# Patient Record
Sex: MALE | Race: WHITE | Employment: FULL TIME | URBAN - METROPOLITAN AREA
[De-identification: names, ages, dates, MRNs, and addresses within clinical notes are randomized per-mention and may not be internally consistent; named-entity substitution may affect disease eponyms.]

---

## 2017-01-12 ENCOUNTER — OFFICE VISIT (OUTPATIENT)
Dept: FAMILY MEDICINE | Facility: CLINIC | Age: 57
End: 2017-01-12
Payer: COMMERCIAL

## 2017-01-12 VITALS
DIASTOLIC BLOOD PRESSURE: 88 MMHG | BODY MASS INDEX: 28.71 KG/M2 | HEIGHT: 69 IN | HEART RATE: 92 BPM | TEMPERATURE: 97.2 F | WEIGHT: 193.8 LBS | SYSTOLIC BLOOD PRESSURE: 132 MMHG

## 2017-01-12 DIAGNOSIS — K21.00 GASTROESOPHAGEAL REFLUX DISEASE WITH ESOPHAGITIS: ICD-10-CM

## 2017-01-12 DIAGNOSIS — Z23 NEED FOR DIPHTHERIA-TETANUS-PERTUSSIS (TDAP) VACCINE: ICD-10-CM

## 2017-01-12 DIAGNOSIS — Z12.11 COLON CANCER SCREENING: ICD-10-CM

## 2017-01-12 DIAGNOSIS — Z00.00 ROUTINE GENERAL MEDICAL EXAMINATION AT A HEALTH CARE FACILITY: Primary | ICD-10-CM

## 2017-01-12 DIAGNOSIS — Z13.6 CARDIOVASCULAR SCREENING; LDL GOAL LESS THAN 160: ICD-10-CM

## 2017-01-12 PROCEDURE — 90471 IMMUNIZATION ADMIN: CPT | Performed by: FAMILY MEDICINE

## 2017-01-12 PROCEDURE — 99396 PREV VISIT EST AGE 40-64: CPT | Mod: 25 | Performed by: FAMILY MEDICINE

## 2017-01-12 PROCEDURE — 90715 TDAP VACCINE 7 YRS/> IM: CPT | Performed by: FAMILY MEDICINE

## 2017-01-12 RX ORDER — MULTIPLE VITAMINS W/ MINERALS TAB 9MG-400MCG
1 TAB ORAL DAILY
COMMUNITY

## 2017-01-12 NOTE — NURSING NOTE
"Chief Complaint   Patient presents with     Physical     Patient is not fasting for labs today.      Initial /88 mmHg  Pulse 92  Temp(Src) 97.2  F (36.2  C) (Tympanic)  Ht 5' 9\" (1.753 m)  Wt 193 lb 12.8 oz (87.907 kg)  BMI 28.61 kg/m2 Estimated body mass index is 28.61 kg/(m^2) as calculated from the following:    Height as of this encounter: 5' 9\" (1.753 m).    Weight as of this encounter: 193 lb 12.8 oz (87.907 kg).  BP completed using cuff size: regular - right arm.  [facility]    Screening Questionnaire for Adult Immunization     Are you sick today?   No   Do you have allergies to medications, food, a vaccine component, or latex?   Yes   Have you ever had a serious reaction after receiving a vaccination?   No   Do you have a long-term health problem with heart disease, lung disease,  asthma, kidney disease, metabolic disease (e.g., diabetes), anemia, or other blood disease?   No   Do you have cancer, leukemia, AIDS, or any immune system problem?   No   Do you take cortisone, prednisone, other steroids, or anticancer drugs, or  have you had radiation treatments?   No   Have you had a seizure or a brain or other nervous system problem?   No   During the past year, have you received a transfusion of blood or blood products, or been given immune (gamma) globulin or an antiviral drug?   No   For women: Are you pregnant or is there a chance you could become         pregnant during the next month?   No   Have you received any vaccinations in the past 4 weeks?   No    Immunization questionnaire was positive for at least one answer.  Notified Dr. Spann.     Screening performed by Vivien Marks on 1/12/2017 at 4:50 PM.    "

## 2017-01-12 NOTE — PROGRESS NOTES
SUBJECTIVE:     CC: Chris Maldonado is an 56 year old male who presents for preventative health visit.     Healthy Habits:    Do you get at least three servings of calcium containing foods daily (dairy, green leafy vegetables, etc.)? yes    Amount of exercise or daily activities, outside of work: 5 day(s) per week    Problems taking medications regularly No    Medication side effects: No    Have you had an eye exam in the past two years? yes    Do you see a dentist twice per year? yes    Do you have sleep apnea, excessive snoring or daytime drowsiness? Yes - daytime drowsiness    Concerns:  * Heartburn - taking OTC Prilosec    Today's PHQ-2 Score:   PHQ-2 ( 1999 Pfizer) 1/12/2017 1/17/2014   Q1: Little interest or pleasure in doing things 0 0   Q2: Feeling down, depressed or hopeless 0 0   PHQ-2 Score 0 0     Abuse: Current or Past(Physical, Sexual or Emotional)- No  Do you feel safe in your environment - Yes    Social History   Substance Use Topics     Smoking status: Former Smoker     Types: Cigars     Smokeless tobacco: Former User     Alcohol Use: No     The patient does not drink >3 drinks per day nor >7 drinks per week.    Last PSA: No results found for: PSA    Recent Labs   Lab Test  01/17/14   0931   CHOL  243*   LDL  141*     Reviewed orders with patient. Reviewed health maintenance and updated orders accordingly - Yes    All Histories reviewed and updated in Epic.    ROS:  C: NEGATIVE for fever, chills, change in weight  I: NEGATIVE for worrisome rashes, moles or lesions  E: NEGATIVE for vision changes or irritation  ENT: NEGATIVE for ear, mouth and throat problems  R: NEGATIVE for significant cough or SOB  CV: NEGATIVE for chest pain, palpitations or peripheral edema  GI: NEGATIVE for nausea, abdominal pain, heartburn, or change in bowel habits   male: negative for dysuria, hematuria, decreased urinary stream, erectile dysfunction, urethral discharge  M: NEGATIVE for significant arthralgias or  "myalgia  N: NEGATIVE for weakness, dizziness or paresthesias  P: NEGATIVE for changes in mood or affect    Problem list, Medication list, Allergies, and Medical/Social/Surgical histories reviewed in EPIC and updated as appropriate.  OBJECTIVE:     /88 mmHg  Pulse 92  Temp(Src) 97.2  F (36.2  C) (Tympanic)  Ht 5' 9\" (1.753 m)  Wt 193 lb 12.8 oz (87.907 kg)  BMI 28.61 kg/m2     EXAM:  GENERAL: healthy, alert and no distress  NECK: no adenopathy, no asymmetry, masses, or scars and thyroid normal to palpation  RESP: lungs clear to auscultation - no rales, rhonchi or wheezes  CV: regular rate and rhythm, normal S1 S2, no S3 or S4, no murmur, click or rub, no peripheral edema and peripheral pulses strong  ABDOMEN: soft, nontender, no hepatosplenomegaly, no masses and bowel sounds normal  MS: no gross musculoskeletal defects noted, no edema    ASSESSMENT/PLAN:     1. Routine general medical examination at a health care facility    - GASTROENTEROLOGY ADULT REFERRAL +/- PROCEDURE  - TDAP (ADACEL AGES 11-64)  - ADMIN 1st VACCINE    2. Colon cancer screening    - GASTROENTEROLOGY ADULT REFERRAL +/- PROCEDURE    3. Need for diphtheria-tetanus-pertussis (Tdap) vaccine    - TDAP (ADACEL AGES 11-64)  - ADMIN 1st VACCINE    COUNSELING:  Reviewed preventive health counseling, as reflected in patient instructions       Regular exercise       Healthy diet/nutrition       Hearing screening       Colon cancer screening       Prostate cancer screening     reports that he has quit smoking. His smoking use included Cigars. He has quit using smokeless tobacco.    Estimated body mass index is 28.61 kg/(m^2) as calculated from the following:    Height as of this encounter: 5' 9\" (1.753 m).    Weight as of this encounter: 193 lb 12.8 oz (87.907 kg).   Weight management plan: Discussed healthy diet and exercise guidelines and patient will follow up in 3 months in clinic to re-evaluate.    Counseling Resources:  ATP IV " Guidelines  Pooled Cohorts Equation Calculator  FRAX Risk Assessment  ICSI Preventive Guidelines  Dietary Guidelines for Americans, 2010  [employer]'s MyPlate  ASA Prophylaxis  Lung CA Screening    Sharan Spann MD  [facility]

## 2017-01-12 NOTE — LETTER
[facility]  [address] [address]  [phone]        January 18, 2018    Chris Maldonado  [address]  [address]  [place] [address]              Dear Chris Maldonado    This is to remind you that your FASTING LAB is due.    You may call our office at [phone] to schedule an appointment.    Please disregard this notice if you have already had your labs drawn or made an appointment.        Sincerely,        Sharan Spann MD

## 2017-01-20 DIAGNOSIS — K21.00 GASTROESOPHAGEAL REFLUX DISEASE WITH ESOPHAGITIS: ICD-10-CM

## 2017-01-20 PROCEDURE — 87338 HPYLORI STOOL AG IA: CPT | Performed by: FAMILY MEDICINE

## 2017-01-23 LAB
H PYLORI AG STL QL IA: NORMAL
MICRO REPORT STATUS: NORMAL
SPECIMEN SOURCE: NORMAL

## 2017-01-25 ENCOUNTER — ANESTHESIA EVENT (OUTPATIENT)
Dept: GASTROENTEROLOGY | Facility: CLINIC | Age: 57
End: 2017-01-25
Payer: COMMERCIAL

## 2017-01-25 NOTE — ANESTHESIA PREPROCEDURE EVALUATION
Anesthesia Evaluation     . Pt has had prior anesthetic. Type: General    No history of anesthetic complications     ROS/MED HX    ENT/Pulmonary:  - neg pulmonary ROS     Neurologic:  - neg neurologic ROS     Cardiovascular:  - neg cardiovascular ROS       METS/Exercise Tolerance:  >4 METS   Hematologic:  - neg hematologic  ROS       Musculoskeletal:  - neg musculoskeletal ROS       GI/Hepatic:     (+) GERD Symptomatic,       Renal/Genitourinary:  - ROS Renal section negative       Endo:  - neg endo ROS       Psychiatric:  - neg psychiatric ROS       Infectious Disease:  - neg infectious disease ROS       Malignancy:      - no malignancy   Other:    - neg other ROS           Physical Exam  Normal systems: pulmonary and dental    Airway   Mallampati: I  TM distance: >3 FB  Neck ROM: full    Dental     Cardiovascular   Rhythm and rate: regular and normal      Pulmonary    breath sounds clear to auscultation                    Anesthesia Plan      History & Physical Review  History and physical reviewed and following examination; no interval change.    ASA Status:  1 .    NPO Status:  > 8 hours    Plan for MAC with Intravenous and Propofol induction. Maintenance will be TIVA.  Reason for MAC:  Deep or markedly invasive procedure (G8)         Postoperative Care      Consents  Anesthetic plan, risks, benefits and alternatives discussed with:  Patient.  Use of blood products discussed: Yes.   Use of blood products discussed with Patient.  .                          .

## 2017-01-27 ENCOUNTER — ANESTHESIA (OUTPATIENT)
Dept: GASTROENTEROLOGY | Facility: CLINIC | Age: 57
End: 2017-01-27
Payer: COMMERCIAL

## 2017-01-27 ENCOUNTER — HOSPITAL ENCOUNTER (OUTPATIENT)
Facility: CLINIC | Age: 57
Discharge: HOME | End: 2017-01-27
Attending: SURGERY | Admitting: SURGERY
Payer: COMMERCIAL

## 2017-01-27 ENCOUNTER — SURGERY (OUTPATIENT)
Age: 57
End: 2017-01-27

## 2017-01-27 VITALS
OXYGEN SATURATION: 96 % | SYSTOLIC BLOOD PRESSURE: 141 MMHG | BODY MASS INDEX: 28.58 KG/M2 | WEIGHT: 193 LBS | RESPIRATION RATE: 16 BRPM | HEIGHT: 69 IN | TEMPERATURE: 97.7 F | DIASTOLIC BLOOD PRESSURE: 92 MMHG

## 2017-01-27 LAB — COLONOSCOPY: NORMAL

## 2017-01-27 PROCEDURE — 25800025 ZZH RX 258: Performed by: SURGERY

## 2017-01-27 PROCEDURE — 37000008 ZZH ANESTHESIA TECHNICAL FEE, 1ST 30 MIN: Performed by: SURGERY

## 2017-01-27 PROCEDURE — G0121 COLON CA SCRN NOT HI RSK IND: HCPCS | Performed by: SURGERY

## 2017-01-27 PROCEDURE — 25000125 ZZHC RX 250

## 2017-01-27 PROCEDURE — 45378 DIAGNOSTIC COLONOSCOPY: CPT | Performed by: SURGERY

## 2017-01-27 PROCEDURE — 25000125 ZZHC RX 250: Performed by: SURGERY

## 2017-01-27 RX ORDER — PROPOFOL 10 MG/ML
INJECTION, EMULSION INTRAVENOUS PRN
Status: DISCONTINUED | OUTPATIENT
Start: 2017-01-27 | End: 2017-01-27

## 2017-01-27 RX ORDER — SODIUM CHLORIDE, SODIUM LACTATE, POTASSIUM CHLORIDE, CALCIUM CHLORIDE 600; 310; 30; 20 MG/100ML; MG/100ML; MG/100ML; MG/100ML
INJECTION, SOLUTION INTRAVENOUS CONTINUOUS
Status: DISCONTINUED | OUTPATIENT
Start: 2017-01-27 | End: 2017-01-27 | Stop reason: HOSPADM

## 2017-01-27 RX ORDER — ONDANSETRON 2 MG/ML
4 INJECTION INTRAMUSCULAR; INTRAVENOUS
Status: DISCONTINUED | OUTPATIENT
Start: 2017-01-27 | End: 2017-01-27 | Stop reason: HOSPADM

## 2017-01-27 RX ORDER — PROPOFOL 10 MG/ML
INJECTION, EMULSION INTRAVENOUS CONTINUOUS PRN
Status: DISCONTINUED | OUTPATIENT
Start: 2017-01-27 | End: 2017-01-27

## 2017-01-27 RX ORDER — LIDOCAINE 40 MG/G
CREAM TOPICAL
Status: DISCONTINUED | OUTPATIENT
Start: 2017-01-27 | End: 2017-01-27 | Stop reason: HOSPADM

## 2017-01-27 RX ADMIN — PROPOFOL 200 MCG/KG/MIN: 10 INJECTION, EMULSION INTRAVENOUS at 11:55

## 2017-01-27 RX ADMIN — PROPOFOL 100 MG: 10 INJECTION, EMULSION INTRAVENOUS at 11:54

## 2017-01-27 RX ADMIN — LIDOCAINE HYDROCHLORIDE 1 ML: 10 INJECTION, SOLUTION INFILTRATION; PERINEURAL at 10:56

## 2017-01-27 RX ADMIN — SODIUM CHLORIDE, POTASSIUM CHLORIDE, SODIUM LACTATE AND CALCIUM CHLORIDE: 600; 310; 30; 20 INJECTION, SOLUTION INTRAVENOUS at 10:56

## 2017-01-27 NOTE — H&P
56 year old year old male here for colonoscopy for screening.    Patient Active Problem List   Diagnosis     CARDIOVASCULAR SCREENING; LDL GOAL LESS THAN 160     Gastroesophageal reflux disease with esophagitis       No past medical history on file.    Past Surgical History   Procedure Laterality Date     Appendectomy  1994     Arthroscopy shoulder rt/lt  2007     tendon repair       @[facility]@    No current outpatient prescriptions on file.    Allergies   Allergen Reactions     Shellfish Allergy      Trees      Tree Pollen       Pt reports that he has quit smoking. His smoking use included Cigars. He has quit using smokeless tobacco. He reports that he does not drink alcohol or use illicit drugs.    Exam:  There were no vitals taken for this visit.    Awake, Alert OX3  Lungs - CTA bilaterally  CV - RRR, no murmurs, distal pulses intact  Abd - soft, non-distended, non-tender, +BS  Extr - No cyanosis or edema    A/P 56 year old year old male in need of colonoscopy for screening. Risks, benefits, alternatives, and complications were discussed including the possibility of perforation and the patient agreed to proceed    John Ramon MD

## 2017-01-27 NOTE — BRIEF OP NOTE
[facility]   Brief Operative Note    Pre-operative diagnosis: screening   Post-operative diagnosis normal colon   Procedure: Procedure(s):  Colonoscopy - Wound Class: II-Clean Contaminated   Surgeon(s): Surgeon(s) and Role:     * John Ramon MD - Primary   Estimated blood loss: * No values recorded between 1/27/2017 12:00 AM and 1/27/2017 12:07 PM *    Specimens: * No specimens in log *   Findings: Normal colon.

## 2017-01-27 NOTE — ANESTHESIA POSTPROCEDURE EVALUATION
Patient: Chris Maldonado    COLONOSCOPY (N/A Rectum)  Additional InformationProcedure(s):  Colonoscopy - Wound Class: II-Clean Contaminated    Diagnosis:screening  Diagnosis Additional Information: No value filed.    Anesthesia Type:  MAC    Note:  Anesthesia Post Evaluation    Patient location during evaluation: Phase 2 and Bedside  Patient participation: Able to fully participate in evaluation  Level of consciousness: awake and alert  Pain management: adequate  Airway patency: patent  Cardiovascular status: acceptable  Respiratory status: acceptable  Hydration status: acceptable  PONV: none             Last vitals:  Filed Vitals:    01/27/17 1026   BP: 119/86   Temp: 36.5  C (97.7  F)   Resp: 16   SpO2: 94%       Electronically Signed By: KARRI Leyva CRNA  January 27, 2017  12:16 PM

## 2018-02-23 ENCOUNTER — TELEPHONE (OUTPATIENT)
Dept: LAB | Facility: CLINIC | Age: 58
End: 2018-02-23

## 2018-04-02 ENCOUNTER — OFFICE VISIT (OUTPATIENT)
Dept: FAMILY MEDICINE | Facility: CLINIC | Age: 58
End: 2018-04-02
Payer: COMMERCIAL

## 2018-04-02 VITALS
HEART RATE: 70 BPM | WEIGHT: 200.3 LBS | RESPIRATION RATE: 18 BRPM | TEMPERATURE: 97.8 F | BODY MASS INDEX: 29.67 KG/M2 | SYSTOLIC BLOOD PRESSURE: 138 MMHG | HEIGHT: 69 IN | DIASTOLIC BLOOD PRESSURE: 88 MMHG

## 2018-04-02 DIAGNOSIS — Z00.01 ENCOUNTER FOR ROUTINE ADULT MEDICAL EXAM WITH ABNORMAL FINDINGS: ICD-10-CM

## 2018-04-02 DIAGNOSIS — Z11.59 NEED FOR HEPATITIS C SCREENING TEST: Primary | ICD-10-CM

## 2018-04-02 DIAGNOSIS — Z00.00 ROUTINE GENERAL MEDICAL EXAMINATION AT A HEALTH CARE FACILITY: ICD-10-CM

## 2018-04-02 DIAGNOSIS — Z13.6 CARDIOVASCULAR SCREENING; LDL GOAL LESS THAN 160: ICD-10-CM

## 2018-04-02 DIAGNOSIS — R73.09 ELEVATED GLUCOSE: ICD-10-CM

## 2018-04-02 LAB
CHOLEST SERPL-MCNC: 204 MG/DL
GLUCOSE SERPL-MCNC: 118 MG/DL (ref 70–99)
HBA1C MFR BLD: 6 % (ref 0–6.4)
HCV AB SERPL QL IA: NONREACTIVE
HDLC SERPL-MCNC: 50 MG/DL
LDLC SERPL CALC-MCNC: 130 MG/DL
NONHDLC SERPL-MCNC: 154 MG/DL
TRIGL SERPL-MCNC: 120 MG/DL

## 2018-04-02 PROCEDURE — 80061 LIPID PANEL: CPT | Performed by: FAMILY MEDICINE

## 2018-04-02 PROCEDURE — 36415 COLL VENOUS BLD VENIPUNCTURE: CPT | Performed by: FAMILY MEDICINE

## 2018-04-02 PROCEDURE — 83036 HEMOGLOBIN GLYCOSYLATED A1C: CPT | Performed by: FAMILY MEDICINE

## 2018-04-02 PROCEDURE — 86803 HEPATITIS C AB TEST: CPT | Performed by: FAMILY MEDICINE

## 2018-04-02 PROCEDURE — 82947 ASSAY GLUCOSE BLOOD QUANT: CPT | Performed by: FAMILY MEDICINE

## 2018-04-02 PROCEDURE — 99396 PREV VISIT EST AGE 40-64: CPT | Performed by: FAMILY MEDICINE

## 2018-04-02 PROCEDURE — 92551 PURE TONE HEARING TEST AIR: CPT | Performed by: FAMILY MEDICINE

## 2018-04-02 ASSESSMENT — PAIN SCALES - GENERAL: PAINLEVEL: NO PAIN (0)

## 2018-04-02 NOTE — MR AVS SNAPSHOT
After Visit Summary   4/2/2018    Chris Maldonado    MRN: [id number]           Patient Information     Date Of Birth          1960        Visit Information        Provider Department      4/2/2018 8:40 AM Sharan Spann MD [facility]        Today's Diagnoses     Need for hepatitis C screening test    -  1    Routine general medical examination at a health care facility        Encounter for routine adult medical exam with abnormal findings        Elevated glucose        CARDIOVASCULAR SCREENING; LDL GOAL LESS THAN 160          Care Instructions      Preventive Health Recommendations  Male Ages 50 - 64    Yearly exam:             See your health care provider every year in order to  o   Review health changes.   o   Discuss preventive care.    o   Review your medicines if your doctor has prescribed any.     Have a cholesterol test every 5 years, or more frequently if you are at risk for high cholesterol/heart disease.     Have a diabetes test (fasting glucose) every three years. If you are at risk for diabetes, you should have this test more often.     Have a colonoscopy at age 50, or have a yearly FIT test (stool test). These exams will check for colon cancer.      Talk with your health care provider about whether or not a prostate cancer screening test (PSA) is right for you.    You should be tested each year for STDs (sexually transmitted diseases), if you re at risk.     Shots: Get a flu shot each year. Get a tetanus shot every 10 years.     Nutrition:    Eat at least 5 servings of fruits and vegetables daily.     Eat whole-grain bread, whole-wheat pasta and brown rice instead of white grains and rice.     Talk to your provider about Calcium and Vitamin D.     Lifestyle    Exercise for at least 150 minutes a week (30 minutes a day, 5 days a week). This will help you control your weight and prevent disease.     Limit alcohol to one drink per day.     No smoking.     Wear sunscreen to  "prevent skin cancer.     See your dentist every six months for an exam and cleaning.     See your eye doctor every 1 to 2 years.            Follow-ups after your visit        Who to contact     Normal or non-critical lab and imaging results will be communicated to you by [employer], letter or phone within 4 business days after the clinic has received the results. If you do not hear from us within 7 days, please contact the clinic through [employer] or phone. If you have a critical or abnormal lab result, we will notify you by phone as soon as possible.  Submit refill requests through [employer] or call your pharmacy and they will forward the refill request to us. Please allow 3 business days for your refill to be completed.          If you need to speak with a  for additional information , please call: [phone]             Additional Information About Your Visit        [employer] Information     [employer] lets you send messages to your doctor, view your test results, renew your prescriptions, schedule appointments and more. To sign up, go to [email or web address] . Click on \"Log in\" on the left side of the screen, which will take you to the Welcome page. Then click on \"Sign up Now\" on the right side of the page.     You will be asked to enter the access code listed below, as well as some personal information. Please follow the directions to create your username and password.     Your access code is: [email or web address]  Expires: 7/3/2018 12:05 PM     Your access code will  in 90 days. If you need help or a new code, please call your [place] clinic or [phone].        Care EveryWhere ID     This is your Care EveryWhere ID. This could be used by other organizations to access your [place] medical records  [id number]        Your Vitals Were     Pulse Temperature Respirations Height BMI (Body Mass Index)       70 97.8  F (36.6  C) (Tympanic) 18 5' 9\" (1.753 m) 29.58 kg/m2        Blood Pressure from Last 3 " Encounters:   04/02/18 138/88   01/27/17 (!) 141/92   01/12/17 132/88    Weight from Last 3 Encounters:   04/02/18 200 lb 4.8 oz (90.9 kg)   01/27/17 193 lb (87.5 kg)   01/12/17 193 lb 12.8 oz (87.9 kg)              We Performed the Following     GLUCOSE     Hemoglobin A1c     Hepatitis C Screen Reflex to HCV RNA Quant and Genotype     Lipid panel reflex to direct LDL Fasting        Primary Care Provider Fax #    Physician No Ref-Primary [phone]       No address on file        Equal Access to Services     [facility]: Hadii angela Pandya, waes dickensadaha, qaybdomingo kaalmajj gilliland, sridhar day . So [facility] [phone].    ATENCIÓN: Si habla español, tiene a chao disposición servicios gratuitos de asistencia lingüística. Llame al [phone].    We comply with applicable federal civil rights laws and [place] laws. We do not discriminate on the basis of race, color, national origin, age, disability, sex, sexual orientation, or gender identity.            Thank you!     Thank you for choosing [facility]  for your care. Our goal is always to provide you with excellent care. Hearing back from our patients is one way we can continue to improve our services. Please take a few minutes to complete the written survey that you may receive in the mail after your visit with us. Thank you!             Your Updated Medication List - Protect others around you: Learn how to safely use, store and throw away your medicines at www.disposemymeds.org.          This list is accurate as of 4/2/18 11:59 PM.  Always use your most recent med list.                   Brand Name Dispense Instructions for use Diagnosis    albuterol 108 (90 BASE) MCG/ACT Inhaler    PROAIR HFA    1 Inhaler    Inhale 2 puffs into the lungs every 4 hours as needed for shortness of breath / dyspnea        fluticasone 50 MCG/ACT spray    FLONASE    16 g    Spray 1-2 sprays into both nostrils daily         Multi-vitamin Tabs tablet      Take 1 tablet by mouth daily        OMEPRAZOLE PO      Take 1 capsule by mouth daily

## 2018-04-02 NOTE — NURSING NOTE
"Chief Complaint   Patient presents with     Physical       Initial BP (!) 139/102 (BP Location: Right arm, Patient Position: Sitting, Cuff Size: Adult Large)  Pulse 70  Temp 97.8  F (36.6  C) (Tympanic)  Resp 18  Ht 5' 9\" (1.753 m)  Wt 200 lb 4.8 oz (90.9 kg)  BMI 29.58 kg/m2 Estimated body mass index is 29.58 kg/(m^2) as calculated from the following:    Height as of this encounter: 5' 9\" (1.753 m).    Weight as of this encounter: 200 lb 4.8 oz (90.9 kg).  Medication Reconciliation: complete   Keerthi Webb CMA  "

## 2018-04-02 NOTE — PROGRESS NOTES
SUBJECTIVE:   CC: Chris Maldonado is an 58 year old male who presents for preventative health visit.     Healthy Habits:    Do you get at least three servings of calcium containing foods daily (dairy, green leafy vegetables, etc.)? yes    Amount of exercise or daily activities, outside of work: 1-2 day(s) per week    Problems taking medications regularly No    Medication side effects: No    Have you had an eye exam in the past two years? yes    Do you see a dentist twice per year? yes    Do you have sleep apnea, excessive snoring or daytime drowsiness?no     Wt Readings from Last 10 Encounters:   04/02/18 200 lb 4.8 oz (90.9 kg)   01/27/17 193 lb (87.5 kg)   01/12/17 193 lb 12.8 oz (87.9 kg)   01/17/14 190 lb 6.4 oz (86.4 kg)     Patient informed that anything we discuss that is not related to preventative medicine, may be billed for; patient verbalizes understanding.      HEARING FREQUENCY    Right Ear:      1000 Hz RESPONSE- on Level:   20 db  (Conditioning sound)   1000 Hz: RESPONSE- on Level:   20 db    2000 Hz: RESPONSE- on Level:   20 db    4000 Hz: RESPONSE- on Level: 40 db    Left Ear:      4000 Hz: RESPONSE- on Level: 35 db   2000 Hz: RESPONSE- on Level:   20 db    1000 Hz: RESPONSE- on Level:   20 db     500 Hz: RESPONSE- on Level: 40 db    Right Ear:    500 Hz: RESPONSE- on Level: 25 db    Hearing Acuity: Pass    Hearing Assessment: normal    Today's PHQ-2 Score:   PHQ-2 ( 1999 Pfizer) 4/2/2018 1/12/2017   Q1: Little interest or pleasure in doing things 0 0   Q2: Feeling down, depressed or hopeless 0 0   PHQ-2 Score 0 0       Abuse: Current or Past(Physical, Sexual or Emotional)- No  Do you feel safe in your environment - Yes    Social History   Substance Use Topics     Smoking status: Former Smoker     Types: Cigars     Smokeless tobacco: Former User     Alcohol use No      If you drink alcohol do you typically have >3 drinks per day or >7 drinks per week? No                      Last PSA: No results  "found for: PSA    Reviewed orders with patient. Reviewed health maintenance and updated orders accordingly - Yes  BP Readings from Last 3 Encounters:   04/02/18 (!) 139/102   01/27/17 (!) 141/92   01/12/17 132/88    Wt Readings from Last 3 Encounters:   04/02/18 200 lb 4.8 oz (90.9 kg)   01/27/17 193 lb (87.5 kg)   01/12/17 193 lb 12.8 oz (87.9 kg)           Reviewed and updated as needed this visit by clinical staff  Tobacco  Allergies  Med Hx  Surg Hx  Fam Hx  Soc Hx        Reviewed and updated as needed this visit by Provider            ROS:  C: NEGATIVE for fever, chills, change in weight  I: NEGATIVE for worrisome rashes, moles or lesions  E: NEGATIVE for vision changes or irritation  ENT: NEGATIVE for ear, mouth and throat problems  R: NEGATIVE for significant cough or SOB  CV: NEGATIVE for chest pain, palpitations or peripheral edema  GI: NEGATIVE for nausea, abdominal pain, heartburn, or change in bowel habits   male: negative for dysuria, hematuria, decreased urinary stream, erectile dysfunction, urethral discharge  M: NEGATIVE for significant arthralgias or myalgia  N: NEGATIVE for weakness, dizziness or paresthesias  P: NEGATIVE for changes in mood or affect    OBJECTIVE:   BP (!) 139/102 (BP Location: Right arm, Patient Position: Sitting, Cuff Size: Adult Large)  Pulse 70  Temp 97.8  F (36.6  C) (Tympanic)  Resp 18  Ht 5' 9\" (1.753 m)  Wt 200 lb 4.8 oz (90.9 kg)  BMI 29.58 kg/m2     EXAM:  GENERAL: healthy, alert and no distress  NECK: no adenopathy, no asymmetry, masses, or scars and thyroid normal to palpation  RESP: lungs clear to auscultation - no rales, rhonchi or wheezes  CV: regular rate and rhythm, normal S1 S2, no S3 or S4, no murmur, click or rub, no peripheral edema and peripheral pulses strong  ABDOMEN: soft, nontender, no hepatosplenomegaly, no masses and bowel sounds normal  MS: no gross musculoskeletal defects noted, no edema    ASSESSMENT/PLAN:   1. Routine general medical " "examination at a health care facility      2. Encounter for routine adult medical exam with abnormal findings      3. Need for hepatitis C screening test    - Hepatitis C Screen Reflex to HCV RNA Quant and Genotype    4. Elevated glucose  Lab Results   Component Value Date    A1C 6.0 04/02/2018       - GLUCOSE  - Hemoglobin A1c    5. CARDIOVASCULAR SCREENING; LDL GOAL LESS THAN 160    - Lipid panel reflex to direct LDL Fasting    COUNSELING:  Reviewed preventive health counseling, as reflected in patient instructions       Regular exercise       Healthy diet/nutrition       Vision screening       Hearing screening       Colon cancer screening       Prostate cancer screening       reports that he has quit smoking. His smoking use included Cigars. He has quit using smokeless tobacco.    Estimated body mass index is 29.58 kg/(m^2) as calculated from the following:    Height as of this encounter: 5' 9\" (1.753 m).    Weight as of this encounter: 200 lb 4.8 oz (90.9 kg).   Weight management plan: Discussed healthy diet and exercise guidelines and patient will follow up in 12 months in clinic to re-evaluate.    Counseling Resources:  ATP IV Guidelines  Pooled Cohorts Equation Calculator  FRAX Risk Assessment  ICSI Preventive Guidelines  Dietary Guidelines for Americans, 2010  USDA's MyPlate  ASA Prophylaxis  Lung CA Screening    Sharan Spann MD  [facility]  "

## 2018-05-04 ENCOUNTER — APPOINTMENT (OUTPATIENT)
Dept: GENERAL RADIOLOGY | Facility: CLINIC | Age: 58
End: 2018-05-04
Attending: PHYSICIAN ASSISTANT
Payer: OTHER MISCELLANEOUS

## 2018-05-04 ENCOUNTER — HOSPITAL ENCOUNTER (EMERGENCY)
Facility: CLINIC | Age: 58
Discharge: HOME | End: 2018-05-04
Attending: PHYSICIAN ASSISTANT | Admitting: PHYSICIAN ASSISTANT
Payer: OTHER MISCELLANEOUS

## 2018-05-04 VITALS
HEART RATE: 86 BPM | TEMPERATURE: 98 F | SYSTOLIC BLOOD PRESSURE: 178 MMHG | RESPIRATION RATE: 14 BRPM | DIASTOLIC BLOOD PRESSURE: 110 MMHG | WEIGHT: 190 LBS | BODY MASS INDEX: 28.06 KG/M2 | OXYGEN SATURATION: 98 %

## 2018-05-04 DIAGNOSIS — S61.312A LACERATION OF RIGHT MIDDLE FINGER WITHOUT FOREIGN BODY WITH DAMAGE TO NAIL, INITIAL ENCOUNTER: ICD-10-CM

## 2018-05-04 PROCEDURE — 99284 EMERGENCY DEPT VISIT MOD MDM: CPT | Mod: 25 | Performed by: PHYSICIAN ASSISTANT

## 2018-05-04 PROCEDURE — 73140 X-RAY EXAM OF FINGER(S): CPT | Mod: RT

## 2018-05-04 PROCEDURE — 12001 RPR S/N/AX/GEN/TRNK 2.5CM/<: CPT | Mod: Z6 | Performed by: PHYSICIAN ASSISTANT

## 2018-05-04 PROCEDURE — 12001 RPR S/N/AX/GEN/TRNK 2.5CM/<: CPT | Performed by: PHYSICIAN ASSISTANT

## 2018-05-04 PROCEDURE — 99283 EMERGENCY DEPT VISIT LOW MDM: CPT | Performed by: PHYSICIAN ASSISTANT

## 2018-05-04 NOTE — DISCHARGE INSTRUCTIONS
Extremity Laceration: Stitches, Staples, or Tape  A laceration is a cut through the skin. If it is deep, it may require stitches or staples to close so it can heal. Minor cuts may be treated with surgical tape closures, or skin glue.  X-rays may be done if something may have entered the skin through the cut. You may also need a tetanus shot if you are not up to date on this vaccine.  Home care    Follow the healthcare provider s instructions on how to care for the cut.    Wash your hands with soap and warm water before and after caring for your wound. This is to help prevent infection.    Keep the wound clean and dry. If a bandage was applied and it becomes wet or dirty, replace it. Otherwise, leave it in place for the first 24 hours, then change it once a day or as directed.    If stitches or staples were used, clean the wound daily:  ? After removing the bandage, wash the area with soap and water. Use a wet cotton swab to loosen and remove any blood or crust that forms.  ? After cleaning, keep the wound clean and dry. Talk with your healthcare provider before putting any antibiotic ointment on the wound. Reapply the bandage.    You may remove the bandage to shower as usual after the first 24 hours, but don't soak the area in water (no swimming) until the stitches or staples are removed.    If surgical tape closures were used, keep the area clean and dry. If it becomes wet, blot it dry with a towel. Let the surgical tape fall off on its own.    The healthcare provider may prescribe an antibiotic cream or ointment to prevent infection. He or she may also prescribe an antibiotic pill. Don't stop taking this medicine until you have finished it all or the provider tells you to stop.    The provider may also prescribe medicine for pain. Follow the instructions for taking these medicines.    Don't do activities that may reopen your wound.  Follow-up care  Follow up with your healthcare provider, or as advised. Most  skin wounds heal within 10 days. But an infection may sometimes occur even with proper treatment. Check the wound daily for the signs of infection listed below. Stitches and staples should be removed within 7 to14 days. If surgical tape closures were used, you may remove them after 10 days if they have not fallen off by then.   When to seek medical advice  Call your healthcare provider right away if any of these occur:    Wound bleeding not controlled by direct pressure    Signs of infection, including increasing pain in the wound, increasing wound redness or swelling, or pus or bad odor coming from the wound    Fever of 100.4 F (38 C) or higher, or as directed by your healthcare provider    Stitches or staples come apart or fall out or surgical tape falls off before 7 days    Wound edges reopen    Wound changes colors    Numbness occurs around the wound     Decreased movement around the injured area  Date Last Reviewed: 7/1/2017 2000-2017 [employer]. [address] [place] [address], [place] [address]. All rights reserved. This information is not intended as a substitute for professional medical care. Always follow your healthcare professional's instructions.

## 2018-05-04 NOTE — ED AVS SNAPSHOT
[facility] Emergency Department    [address] [facility] [address]    Phone:  [phone]    Fax:  [phone]                                       Chris Maldonado   MRN: [id number]    Department:  [facility] Emergency Department   Date of Visit:  5/4/2018           After Visit Summary Signature Page     I have received my discharge instructions, and my questions have been answered. I have discussed any challenges I see with this plan with the nurse or doctor.    ..........................................................................................................................................  Patient/Patient Representative Signature      ..........................................................................................................................................  Patient Representative Print Name and Relationship to Patient    ..................................................               ................................................  Date                                            Time    ..........................................................................................................................................  Reviewed by Signature/Title    ...................................................              ..............................................  Date                                                            Time

## 2018-05-04 NOTE — ED PROVIDER NOTES
History     Chief Complaint   Patient presents with     Hand Injury     finger injury at work     HPI  Chris Maldonado is a 58 year old right-hand-dominant male who presents to the urgent emergency department with concern over right long finger pain after injury which occurred just prior to arrival when it was crushed between piece of metal and a large tire at work.  She has mild to moderate pain in the area.  He states concern that he had significant amount of bleeding.  He denies any distal numbness or paresthesias.  No concern over foreign body embedded in the wound.  His tetanus vaccine is up-to-date per his report and [facility] records indicate it was given last year.     Problem List:    Patient Active Problem List    Diagnosis Date Noted     Gastroesophageal reflux disease with esophagitis 01/12/2017     Priority: Medium     CARDIOVASCULAR SCREENING; LDL GOAL LESS THAN 160 01/17/2014     Priority: Medium      Past Medical History:    No past medical history on file.    Past Surgical History:    Past Surgical History:   Procedure Laterality Date     APPENDECTOMY  1994     ARTHROSCOPY SHOULDER RT/LT  2007    tendon repair     COLONOSCOPY N/A 1/27/2017    Procedure: COLONOSCOPY;  Surgeon: John Ramon MD;  Location: [facility]     Family History:    Family History   Problem Relation Age of Onset     Heart Defect Mother      Obesity Mother      Dementia Father      Alzheimer Disease Father      Crohn Disease Sister      CEREBROVASCULAR DISEASE Daughter 33     DIABETES Daughter      Type 1     C.A.D. No family hx of      Hypertension No family hx of      Prostate Cancer No family hx of      Cancer - colorectal No family hx of      Breast Cancer No family hx of      Social History:  Marital Status:   [4]  Social History   Substance Use Topics     Smoking status: Former Smoker     Types: Cigars     Smokeless tobacco: Former User     Alcohol use No     Medications:      albuterol (ALBUTEROL) 108 (90 BASE) MCG/ACT  inhaler   fluticasone (FLONASE) 50 MCG/ACT nasal spray   multivitamin, therapeutic with minerals (MULTI-VITAMIN) TABS tablet   OMEPRAZOLE PO     Review of Systems  CONSTITUTIONAL:NEGATIVE for fever, chills, change in weight  INTEGUMENTARY/SKIN: POSITIVE for laceration to right long finger NEGATIVE for other worrisome lesions   RESP:NEGATIVE for significant cough or SOB  MUSCULOSKELETAL: POSITIVE  for mild right long finger pain and NEGATIVE for other new concerning significant arthralgia or myalgias   NEURO: NEGATIVE for numbness, paresthesias   Physical Exam   BP: (!) 178/110  Pulse: 86  Temp: 98  F (36.7  C)  Resp: 14  Weight: 86.2 kg (190 lb)  SpO2: 98 %  Physical Exam   Constitutional: He is oriented to person, place, and time. He appears well-developed and well-nourished. No distress.   Cardiovascular:   Pulses:       Radial pulses are 2+ on the left side.   Musculoskeletal:        Right wrist: Normal.        Right hand: He exhibits tenderness, bony tenderness, laceration and swelling. He exhibits normal range of motion, normal two-point discrimination, normal capillary refill and no deformity. Normal strength noted.   Patient has a 1.5 cm superficial laceration to the palmar surface of his distal left long finger.  There is smaller 0.5 cm superficial laceration which runs adjacent to this and an additional 0.5 cm superficial well approximated laceration which runs along the distal edge of the nailed    Neurological: He is alert and oriented to person, place, and time. No sensory deficit.   Skin: Skin is warm and dry. Laceration noted. No abrasion, no ecchymosis and no rash noted. No erythema.     ED Course     ED Course     Laceration repair  Date/Time: 5/4/2018 12:32 PM  Performed by: KEYSHAWN CHADWICK  Authorized by: KEYSHAWN CHADWICK   Consent: Verbal consent obtained.  Risks and benefits: risks, benefits and alternatives were discussed  Consent given by: patient  Patient identity confirmed: verbally with  patient  Body area: upper extremity  Location details: right long finger  Laceration length: 2 (total divided into three separate cuts) cm  Foreign bodies: no foreign bodies  Tendon involvement: none  Nerve involvement: none  Vascular damage: no  Anesthesia: local infiltration    Anesthesia:  Local Anesthetic: lidocaine 1% without epinephrine  Anesthetic total: 0.5 mL    Sedation:  Patient sedated: no  Preparation: Patient was prepped and draped in the usual sterile fashion.  Amount of cleaning: standard  Skin closure: 5-0 nylon  Number of sutures: 3  Technique: simple  Approximation: close  Dressing: antibiotic ointment  Patient tolerance: Patient tolerated the procedure well with no immediate complications              Critical Care time:  none          Results for orders placed or performed during the hospital encounter of 05/04/18 (from the past 24 hour(s))   XR Finger Right G/E 2 Views    Narrative    XR FINGER RT G/E 2 VW 5/4/2018 11:36 AM    HISTORY: Pain. Crush injury.    COMPARISON: None.      Impression    IMPRESSION: 3 views of the right middle finger show no fracture or  malalignment.     BRANDYN COLLINS MD     Medications - No data to display    Assessments & Plan (with Medical Decision Making)     I have reviewed the nursing notes.    I have reviewed the findings, diagnosis, plan and need for follow up with the patient.       New Prescriptions    No medications on file     Final diagnoses:   Laceration of right middle finger without foreign body with damage to nail, initial encounter     58-year-old male presents to the emergency department with concern over work-related crush injury with laceration to his distal right long finger.  Patient was noted to be significantly hypertensive on arrival, remainder vital signs within normal limits.  Physical exam findings as described above were significant for his 3 small superficial lacerations to the distal right long finger which were fairly well approximated.   I did discuss her/benefits of primary versus secondary closure and options for primary closure and patient agreed to proceed with closure with sutures with localized lidocaine injection being used as anesthetic.  He tolerated placement of three 5-0 Nylon sutures total without immediate complication.  After procedure wound was dressed with bacitracin and a bandage.  He was instructed to follow-up with primary care provider for suture removal within the next 7-10 days.  I did discuss with patient that his blood pressure was significantly elevated today and recommended discussing with primary care provider.  Patient states he is aware and has been making lifestyle changes however primary states that he will likely need to initiate antihypertensive therapy.  Wound care instructions, signs of infection, worrisome reasons to return to return to ER sooner discussed.      Disclaimer: This note consists of symbols derived from keyboarding, dictation, and/or voice recognition software. As a result, there may be errors in the script that have gone undetected.  Please consider this when interpreting information found in the chart.    5/4/2018   [facility] EMERGENCY DEPARTMENT     Natty Abebe PA-C  05/04/18 1236

## 2018-05-04 NOTE — ED AVS SNAPSHOT
[facility] Emergency Department    [address] [place] [address]    Phone:  [phone]    Fax:  [phone]                                       Chris Maldonado   MRN: [id number]    Department:  [facility] Emergency Department   Date of Visit:  5/4/2018           Patient Information     Date Of Birth          1960        Your diagnoses for this visit were:     Laceration of right middle finger without foreign body with damage to nail, initial encounter        You were seen by Natty Abebe PA-C.      Follow-up Information     Follow up with Sharan Spann MD In 10 days.    Specialty:  Family Practice    Why:  for suture removal or sooner if new or worsening symptoms     Contact information:    [address] [place] [address]  [phone]          Discharge Instructions         Extremity Laceration: Stitches, Staples, or Tape  A laceration is a cut through the skin. If it is deep, it may require stitches or staples to close so it can heal. Minor cuts may be treated with surgical tape closures, or skin glue.  X-rays may be done if something may have entered the skin through the cut. You may also need a tetanus shot if you are not up to date on this vaccine.  Home care    Follow the healthcare provider s instructions on how to care for the cut.    Wash your hands with soap and warm water before and after caring for your wound. This is to help prevent infection.    Keep the wound clean and dry. If a bandage was applied and it becomes wet or dirty, replace it. Otherwise, leave it in place for the first 24 hours, then change it once a day or as directed.    If stitches or staples were used, clean the wound daily:  ? After removing the bandage, wash the area with soap and water. Use a wet cotton swab to loosen and remove any blood or crust that forms.  ? After cleaning, keep the wound clean and dry. Talk with your healthcare provider before putting any antibiotic ointment on the  wound. Reapply the bandage.    You may remove the bandage to shower as usual after the first 24 hours, but don't soak the area in water (no swimming) until the stitches or staples are removed.    If surgical tape closures were used, keep the area clean and dry. If it becomes wet, blot it dry with a towel. Let the surgical tape fall off on its own.    The healthcare provider may prescribe an antibiotic cream or ointment to prevent infection. He or she may also prescribe an antibiotic pill. Don't stop taking this medicine until you have finished it all or the provider tells you to stop.    The provider may also prescribe medicine for pain. Follow the instructions for taking these medicines.    Don't do activities that may reopen your wound.  Follow-up care  Follow up with your healthcare provider, or as advised. Most skin wounds heal within 10 days. But an infection may sometimes occur even with proper treatment. Check the wound daily for the signs of infection listed below. Stitches and staples should be removed within 7 to14 days. If surgical tape closures were used, you may remove them after 10 days if they have not fallen off by then.   When to seek medical advice  Call your healthcare provider right away if any of these occur:    Wound bleeding not controlled by direct pressure    Signs of infection, including increasing pain in the wound, increasing wound redness or swelling, or pus or bad odor coming from the wound    Fever of 100.4 F (38 C) or higher, or as directed by your healthcare provider    Stitches or staples come apart or fall out or surgical tape falls off before 7 days    Wound edges reopen    Wound changes colors    Numbness occurs around the wound     Decreased movement around the injured area  Date Last Reviewed: 7/1/2017 2000-2017 [employer]. [address] [place] [address], [place] [address]. All rights reserved. This information is not intended as a substitute for professional medical  care. Always follow your healthcare professional's instructions.          24 Hour Appointment Hotline       To make an appointment at any [facility], call [phone] ([phone]). If you don't have a family doctor or clinic, we will help you find one. [facility] are conveniently located to serve the needs of you and your family.             Review of your medicines      Our records show that you are taking the medicines listed below. If these are incorrect, please call your family doctor or clinic.        Dose / Directions Last dose taken    albuterol 108 (90 Base) MCG/ACT Inhaler   Commonly known as:  PROAIR HFA   Dose:  2 puff   Quantity:  1 Inhaler        Inhale 2 puffs into the lungs every 4 hours as needed for shortness of breath / dyspnea   Refills:  0        fluticasone 50 MCG/ACT spray   Commonly known as:  FLONASE   Dose:  1-2 spray   Quantity:  16 g        Spray 1-2 sprays into both nostrils daily   Refills:  0        Multi-vitamin Tabs tablet   Dose:  1 tablet        Take 1 tablet by mouth daily   Refills:  0        OMEPRAZOLE PO   Dose:  1 capsule        Take 1 capsule by mouth daily   Refills:  0                Procedures and tests performed during your visit     Laceration repair    XR Finger Right G/E 2 Views      Orders Needing Specimen Collection     None      Pending Results     No orders found from 5/2/2018 to 5/5/2018.            Pending Culture Results     No orders found from 5/2/2018 to 5/5/2018.            Pending Results Instructions     If you had any lab results that were not finalized at the time of your Discharge, you can call the ED Lab Result RN at [phone]. You will be contacted by this team for any positive Lab results or changes in treatment. The nurses are available 7 days a week from 10A to 6:30P.  You can leave a message 24 hours per day and they will return your call.        Test Results From Your Hospital Stay        5/4/2018 12:05 PM      Narrative      "XR FINGER RT G/E 2 VW 2018 11:36 AM    HISTORY: Pain. Crush injury.    COMPARISON: None.        Impression     IMPRESSION: 3 views of the right middle finger show no fracture or  malalignment.     BRANDYN COLLINS MD                Thank you for choosing [facility]       Thank you for choosing [facility] for your care. Our goal is always to provide you with excellent care. Hearing back from our patients is one way we can continue to improve our services. Please take a few minutes to complete the written survey that you may receive in the mail after you visit with us. Thank you!        [employer] Information     [employer] lets you send messages to your doctor, view your test results, renew your prescriptions, schedule appointments and more. To sign up, go to [email or web address] . Click on \"Log in\" on the left side of the screen, which will take you to the Welcome page. Then click on \"Sign up Now\" on the right side of the page.     You will be asked to enter the access code listed below, as well as some personal information. Please follow the directions to create your username and password.     Your access code is: [email or web address]  Expires: 7/3/2018 12:05 PM     Your access code will  in 90 days. If you need help or a new code, please call your [facility] clinic or [phone].        Care EveryWhere ID     This is your Care EveryWhere ID. This could be used by other organizations to access your [facility] medical records  [id number]        Equal Access to Services     EVAN YANCEY AH: Hadii angela ramos [place], waaxda luqadaha, qaybta kaalmada darshana, sridhar day . So [facility] [phone].    ATENCIÓN: Si habla español, tiene a chao disposición servicios gratuitos de asistencia lingüística. Llame al [phone].    We comply with applicable federal civil rights laws and [place] laws. We do not discriminate on the basis of race, color, national origin, age, disability, sex, sexual " orientation, or gender identity.            After Visit Summary       This is your record. Keep this with you and show to your community pharmacist(s) and doctor(s) at your next visit.

## 2018-05-14 DIAGNOSIS — J30.1 CHRONIC SEASONAL ALLERGIC RHINITIS DUE TO POLLEN: Primary | ICD-10-CM

## 2018-05-14 NOTE — TELEPHONE ENCOUNTER
"FLUTICASONE 50MCG/ACT SPRAY        Last Written Prescription Date:  5/14/16  Last Fill Quantity: 16,   # refills: 0  Last Office Visit: 4/2/18  Future Office visit:       Requested Prescriptions   Pending Prescriptions Disp Refills     fluticasone (FLONASE) 50 MCG/ACT spray [Pharmacy Med Name: FLUTICASONE 50MCG/ACT SPRAY] 16 g      Sig: USE 1-2 SPRAYS IN EACH NOSTRIL EVERY DAY    Inhaled Steroids Protocol Passed    5/14/2018  4:15 PM       Passed - Patient is age 12 or older       Passed - Recent (12 mo) or future (30 days) visit within the authorizing provider's specialty    Patient had office visit in the last 12 months or has a visit in the next 30 days with authorizing provider or within the authorizing provider's specialty.  See \"Patient Info\" tab in inbasket, or \"Choose Columns\" in Meds & Orders section of the refill encounter.              "

## 2018-05-15 NOTE — TELEPHONE ENCOUNTER
Routing refill request to provider for review/approval because:  Ordered in ED 5/14/2016. Pt seen for viral URI and elevated BP.  No associated dx.    Kalli ANGELES RN

## 2018-05-16 RX ORDER — FLUTICASONE PROPIONATE 50 MCG
SPRAY, SUSPENSION (ML) NASAL
Qty: 16 G | Refills: 11 | Status: SHIPPED | OUTPATIENT
Start: 2018-05-16

## 2019-01-14 ENCOUNTER — HOSPITAL ENCOUNTER (EMERGENCY)
Facility: CLINIC | Age: 59
Discharge: HOME | End: 2019-01-14
Attending: EMERGENCY MEDICINE | Admitting: EMERGENCY MEDICINE
Payer: COMMERCIAL

## 2019-01-14 VITALS
TEMPERATURE: 98.1 F | WEIGHT: 194 LBS | DIASTOLIC BLOOD PRESSURE: 105 MMHG | BODY MASS INDEX: 28.73 KG/M2 | RESPIRATION RATE: 14 BRPM | OXYGEN SATURATION: 93 % | HEART RATE: 86 BPM | SYSTOLIC BLOOD PRESSURE: 153 MMHG | HEIGHT: 69 IN

## 2019-01-14 DIAGNOSIS — I49.1 PREMATURE ATRIAL CONTRACTIONS: ICD-10-CM

## 2019-01-14 DIAGNOSIS — R00.2 PALPITATIONS: ICD-10-CM

## 2019-01-14 DIAGNOSIS — I10 BENIGN ESSENTIAL HYPERTENSION: ICD-10-CM

## 2019-01-14 LAB
ANION GAP SERPL CALCULATED.3IONS-SCNC: 11 MMOL/L (ref 3–14)
BASOPHILS # BLD AUTO: 0.1 10E9/L (ref 0–0.2)
BASOPHILS NFR BLD AUTO: 1.1 %
BUN SERPL-MCNC: 23 MG/DL (ref 7–30)
CALCIUM SERPL-MCNC: 8.6 MG/DL (ref 8.5–10.1)
CHLORIDE SERPL-SCNC: 102 MMOL/L (ref 94–109)
CO2 SERPL-SCNC: 28 MMOL/L (ref 20–32)
CREAT SERPL-MCNC: 1.11 MG/DL (ref 0.66–1.25)
DIFFERENTIAL METHOD BLD: ABNORMAL
EOSINOPHIL # BLD AUTO: 0.5 10E9/L (ref 0–0.7)
EOSINOPHIL NFR BLD AUTO: 4.8 %
ERYTHROCYTE [DISTWIDTH] IN BLOOD BY AUTOMATED COUNT: 12.1 % (ref 10–15)
GFR SERPL CREATININE-BSD FRML MDRD: 73 ML/MIN/{1.73_M2}
GLUCOSE SERPL-MCNC: 131 MG/DL (ref 70–99)
HCT VFR BLD AUTO: 48.3 % (ref 40–53)
HGB BLD-MCNC: 16.7 G/DL (ref 13.3–17.7)
IMM GRANULOCYTES # BLD: 0 10E9/L (ref 0–0.4)
IMM GRANULOCYTES NFR BLD: 0.4 %
LYMPHOCYTES # BLD AUTO: 3.2 10E9/L (ref 0.8–5.3)
LYMPHOCYTES NFR BLD AUTO: 33.7 %
MCH RBC QN AUTO: 33.5 PG (ref 26.5–33)
MCHC RBC AUTO-ENTMCNC: 34.6 G/DL (ref 31.5–36.5)
MCV RBC AUTO: 97 FL (ref 78–100)
MONOCYTES # BLD AUTO: 1.1 10E9/L (ref 0–1.3)
MONOCYTES NFR BLD AUTO: 11.4 %
NEUTROPHILS # BLD AUTO: 4.6 10E9/L (ref 1.6–8.3)
NEUTROPHILS NFR BLD AUTO: 48.6 %
NRBC # BLD AUTO: 0 10*3/UL
NRBC BLD AUTO-RTO: 0 /100
PLATELET # BLD AUTO: 206 10E9/L (ref 150–450)
POTASSIUM SERPL-SCNC: 3.4 MMOL/L (ref 3.4–5.3)
RBC # BLD AUTO: 4.98 10E12/L (ref 4.4–5.9)
SODIUM SERPL-SCNC: 141 MMOL/L (ref 133–144)
TROPONIN I SERPL-MCNC: <0.015 UG/L (ref 0–0.04)
TSH SERPL DL<=0.005 MIU/L-ACNC: 3.82 MU/L (ref 0.4–4)
WBC # BLD AUTO: 9.5 10E9/L (ref 4–11)

## 2019-01-14 PROCEDURE — 25000125 ZZHC RX 250: Performed by: EMERGENCY MEDICINE

## 2019-01-14 PROCEDURE — 84443 ASSAY THYROID STIM HORMONE: CPT | Performed by: EMERGENCY MEDICINE

## 2019-01-14 PROCEDURE — 93010 ELECTROCARDIOGRAM REPORT: CPT | Mod: Z6 | Performed by: EMERGENCY MEDICINE

## 2019-01-14 PROCEDURE — 93005 ELECTROCARDIOGRAM TRACING: CPT | Performed by: EMERGENCY MEDICINE

## 2019-01-14 PROCEDURE — 84484 ASSAY OF TROPONIN QUANT: CPT | Performed by: EMERGENCY MEDICINE

## 2019-01-14 PROCEDURE — 85025 COMPLETE CBC W/AUTO DIFF WBC: CPT | Performed by: EMERGENCY MEDICINE

## 2019-01-14 PROCEDURE — 80048 BASIC METABOLIC PNL TOTAL CA: CPT | Performed by: EMERGENCY MEDICINE

## 2019-01-14 PROCEDURE — 99284 EMERGENCY DEPT VISIT MOD MDM: CPT | Mod: 25 | Performed by: EMERGENCY MEDICINE

## 2019-01-14 PROCEDURE — 96374 THER/PROPH/DIAG INJ IV PUSH: CPT | Performed by: EMERGENCY MEDICINE

## 2019-01-14 PROCEDURE — 99284 EMERGENCY DEPT VISIT MOD MDM: CPT | Performed by: EMERGENCY MEDICINE

## 2019-01-14 RX ORDER — METOPROLOL TARTRATE 1 MG/ML
5 INJECTION, SOLUTION INTRAVENOUS EVERY 5 MIN PRN
Status: DISCONTINUED | OUTPATIENT
Start: 2019-01-14 | End: 2019-01-14 | Stop reason: HOSPADM

## 2019-01-14 RX ORDER — METOPROLOL TARTRATE 25 MG/1
25 TABLET, FILM COATED ORAL
Qty: 60 TABLET | Refills: 0 | Status: SHIPPED | OUTPATIENT
Start: 2019-01-14 | End: 2019-02-06

## 2019-01-14 RX ADMIN — METOPROLOL TARTRATE 5 MG: 1 INJECTION, SOLUTION INTRAVENOUS at 20:57

## 2019-01-14 ASSESSMENT — ENCOUNTER SYMPTOMS
CONFUSION: 0
COLOR CHANGE: 0
PALPITATIONS: 1
EYE REDNESS: 0
FEVER: 0
ABDOMINAL PAIN: 0
NECK STIFFNESS: 0
DIFFICULTY URINATING: 0
HEADACHES: 0
SHORTNESS OF BREATH: 0
ARTHRALGIAS: 0

## 2019-01-14 ASSESSMENT — MIFFLIN-ST. JEOR: SCORE: 1695.36

## 2019-01-14 NOTE — ED AVS SNAPSHOT
[facility] Emergency Department  [address] [address]  Phone:  [phone]  Fax:  [phone]                                    Chris Maldonado   MRN: [id number]    Department:  [facility] Emergency Department   Date of Visit:  1/14/2019           After Visit Summary Signature Page    I have received my discharge instructions, and my questions have been answered. I have discussed any challenges I see with this plan with the nurse or doctor.    ..........................................................................................................................................  Patient/Patient Representative Signature      ..........................................................................................................................................  Patient Representative Print Name and Relationship to Patient    ..................................................               ................................................  Date                                   Time    ..........................................................................................................................................  Reviewed by Signature/Title    ...................................................              ..............................................  Date                                               Time          22EPIC Rev 08/18

## 2019-01-15 ENCOUNTER — HOSPITAL ENCOUNTER (OUTPATIENT)
Dept: CARDIOLOGY | Facility: CLINIC | Age: 59
Discharge: HOME | End: 2019-01-15
Attending: FAMILY MEDICINE | Admitting: FAMILY MEDICINE
Payer: COMMERCIAL

## 2019-01-15 DIAGNOSIS — R00.2 PALPITATIONS: ICD-10-CM

## 2019-01-15 PROCEDURE — 0296T ZIO PATCH HOLTER ADULT PEDIATRIC GREATER THAN 48 HRS: CPT

## 2019-01-15 PROCEDURE — 0298T ZIO PATCH HOLTER ADULT PEDIATRIC GREATER THAN 48 HRS: CPT | Performed by: INTERNAL MEDICINE

## 2019-01-15 NOTE — ED PROVIDER NOTES
History     Chief Complaint   Patient presents with     Hypertension     pt was prescribed a blood pressure medication by PCP in [place], [place]. started the medication on sunday. has had a high blood pressure all day and noticed some tingling in his left arm      Chest Pain     started about 30 min ago     HPI  Chris Maldonado is a 58 year old male who has past medical history significant for GERD, and recent elevated blood pressures, with starting of hydrochlorothiazide on Sunday, yesterday.  Daughter is a nurse practitioner in [place], and started the patient on hydrochlorothiazide.  Patient does not have primary care provider, however has seen Dr. Spann on one occasion 2 years ago.    Patient presents to the emergency department today because of feelings of heart pounding, with racing sensation, and heart beating strongly throughout the afternoon and evening hours.  He took his blood pressure at home after returning home from work, and noted that his blood pressure was elevated.  Patient was sitting down, eating, and had a glass of wine to see if this would help, and did not help, and once he began noticing slight amounts of tingling in the fingers of the left upper extremity, he decided to come to the emergency department.  Denies any chest pain.  No shortness of breath.  No recent shortness of breath with activity.  No personal history of similar types of occurrences.  No change in food intake recently.  Drinks coffee/caffeine daily, without recent changes.  Denies nausea, vomiting, diaphoresis, abdominal pain, fever, or recent illnesses.  Patient does have a family history of coronary disease in mother.  Patient does not take any daily medications including no aspirin with the exception of the recently started medication yesterday.  No tobacco use    Problem List:    Patient Active Problem List    Diagnosis Date Noted     Gastroesophageal reflux disease with esophagitis 01/12/2017     Priority: Medium      CARDIOVASCULAR SCREENING; LDL GOAL LESS THAN 160 01/17/2014     Priority: Medium        Past Medical History:    No past medical history on file.    Past Surgical History:    Past Surgical History:   Procedure Laterality Date     APPENDECTOMY  1994     ARTHROSCOPY SHOULDER RT/LT  2007    tendon repair     COLONOSCOPY N/A 1/27/2017    Procedure: COLONOSCOPY;  Surgeon: John Ramon MD;  Location: [facility]       Family History:    Family History   Problem Relation Age of Onset     Heart Defect Mother      Obesity Mother      Dementia Father      Alzheimer Disease Father      Crohn's Disease Sister      Cerebrovascular Disease Daughter 33     Diabetes Daughter         Type 1     C.A.D. No family hx of      Hypertension No family hx of      Prostate Cancer No family hx of      Cancer - colorectal No family hx of      Breast Cancer No family hx of        Social History:  Marital Status:   [4]  Social History     Tobacco Use     Smoking status: Former Smoker     Types: Cigars     Smokeless tobacco: Former User   Substance Use Topics     Alcohol use: No     Drug use: No        Medications:      metoprolol tartrate (LOPRESSOR) 25 MG tablet   albuterol (ALBUTEROL) 108 (90 BASE) MCG/ACT inhaler   fluticasone (FLONASE) 50 MCG/ACT spray   multivitamin, therapeutic with minerals (MULTI-VITAMIN) TABS tablet   OMEPRAZOLE PO         Review of Systems   Constitutional: Negative for fever.   HENT: Negative for congestion.    Eyes: Negative for redness.   Respiratory: Negative for shortness of breath.    Cardiovascular: Positive for palpitations. Negative for chest pain.   Gastrointestinal: Negative for abdominal pain.   Genitourinary: Negative for difficulty urinating.   Musculoskeletal: Negative for arthralgias and neck stiffness.   Skin: Negative for color change.   Neurological: Negative for headaches.   Psychiatric/Behavioral: Negative for confusion.       Physical Exam   BP: (!) 207/116  Pulse: 58  Heart Rate: 97  Temp:  "98.1  F (36.7  C)  Resp: 14  Height: 175.3 cm (5' 9\")  Weight: 88 kg (194 lb)  SpO2: 97 %      Physical Exam  BP (!) 153/105   Pulse 86   Temp 98.1  F (36.7  C) (Oral)   Resp 14   Ht 1.753 m (5' 9\")   Wt 88 kg (194 lb)   SpO2 93%   BMI 28.65 kg/m    General: alert, interactive, in no apparent distress  Head: atraumatic  Nose: no rhinorrhea or epistaxis  Ears: no external auditory canal discharge or bleeding.    Eyes: Sclera nonicteric. Conjunctiva noninjected. PERRL, EOMI  Mouth: no tonsillar erythema, edema, or exudate  Neck: supple, no palp LAD  Lungs: CTAB  CV: tachycardic., S1/S2; peripheral pulses palpable and symmetric  Abdomen: soft, nt, nd, no guarding or rebound. Positive bowel sounds  Extremities: no cyanosis or edema  Skin: no rash or diaphoresis  Neuro:   strength 5/5 in UE and LEs bilaterally, sensation intact to light touch in UE and LEs bilaterally;       ED Course        Procedures               EKG Interpretation:      Interpreted by John Jacome  Time reviewed: 2040  Symptoms at time of EKG: palpitations   Rhythm: normal sinus   Rate: Tachycardia  Axis: Normal  Ectopy: premature atrial contraction  Conduction: normal  ST Segments/ T Waves: No acute ischemic changes  Comparison to prior: No old EKG available    Clinical Impression: PACs present.                Critical Care time:  none               Results for orders placed or performed during the hospital encounter of 01/14/19 (from the past 24 hour(s))   CBC with platelets differential   Result Value Ref Range    WBC 9.5 4.0 - 11.0 10e9/L    RBC Count 4.98 4.4 - 5.9 10e12/L    Hemoglobin 16.7 13.3 - 17.7 g/dL    Hematocrit 48.3 40.0 - 53.0 %    MCV 97 78 - 100 fl    MCH 33.5 (H) 26.5 - 33.0 pg    MCHC 34.6 31.5 - 36.5 g/dL    RDW 12.1 10.0 - 15.0 %    Platelet Count 206 150 - 450 10e9/L    Diff Method Automated Method     % Neutrophils 48.6 %    % Lymphocytes 33.7 %    % Monocytes 11.4 %    % Eosinophils 4.8 %    % Basophils 1.1 %    " % Immature Granulocytes 0.4 %    Nucleated RBCs 0 0 /100    Absolute Neutrophil 4.6 1.6 - 8.3 10e9/L    Absolute Lymphocytes 3.2 0.8 - 5.3 10e9/L    Absolute Monocytes 1.1 0.0 - 1.3 10e9/L    Absolute Eosinophils 0.5 0.0 - 0.7 10e9/L    Absolute Basophils 0.1 0.0 - 0.2 10e9/L    Abs Immature Granulocytes 0.0 0 - 0.4 10e9/L    Absolute Nucleated RBC 0.0    Basic metabolic panel   Result Value Ref Range    Sodium 141 133 - 144 mmol/L    Potassium 3.4 3.4 - 5.3 mmol/L    Chloride 102 94 - 109 mmol/L    Carbon Dioxide 28 20 - 32 mmol/L    Anion Gap 11 3 - 14 mmol/L    Glucose 131 (H) 70 - 99 mg/dL    Urea Nitrogen 23 7 - 30 mg/dL    Creatinine 1.11 0.66 - 1.25 mg/dL    GFR Estimate 73 >60 mL/min/[1.73_m2]    GFR Estimate If Black 84 >60 mL/min/[1.73_m2]    Calcium 8.6 8.5 - 10.1 mg/dL   Troponin I   Result Value Ref Range    Troponin I ES <0.015 0.000 - 0.045 ug/L   TSH with free T4 reflex   Result Value Ref Range    TSH 3.82 0.40 - 4.00 mU/L       Medications   metoprolol (LOPRESSOR) injection 5 mg (5 mg Intravenous Given 1/14/19 2057)       Assessments & Plan (with Medical Decision Making)  58 year old male, with history of elevated blood pressure recordings previously, GERD, presenting to the emergency department with pounding sensation of the chest, with palpitations that have been present throughout the afternoon and evening hours.  No specific chest pains.  Patient does arrive hypertensive with blood pressure is 170 systolic upon arrival.  Denies chest pain.  No shortness of breath.  Did have slight amounts of tingling of the left upper extremity prior to arrival.  Blood pressures in bilateral upper extremities are normal.  Patient denies any severe chest pains, and denies any back pain.  Extremely low concern for dissection, or other cause of symptoms.  Patient has primary complaint of heart racing/palpitations and pounding sensation of his chest.  This completely resolved after metoprolol administration of 5 mg  IV metoprolol.  He did have evidence of frequent PACs on the monitor, and had a PAC on his EKG, reviewed by myself.  Heart rates were in the low 100s upon arrival.  After metoprolol administration, heart rates improved to the 80 bpm range, with frequent heart rates in the 70s.  No further significant ectopy is noted on telemetry monitoring.  Laboratory workup including CBC, BMP, troponin, and TSH are normal.  I discussed with patient need for follow-up with primary care provider.  I have ordered outpatient Holter monitor, and requested that patient follow-up with his primary care provider.  Would consider change of blood pressure medicine to metoprolol rather than hydrochlorothiazide.  Will defer that decision to primary care providers.  Patient given metoprolol 25mg to be taken PRN if symptoms recur.  To present ot the ED if taking more than two tablets to control symptoms.       I have reviewed the nursing notes.    I have reviewed the findings, diagnosis, plan and need for follow up with the patient.          Medication List      Started    metoprolol tartrate 25 MG tablet  Commonly known as:  LOPRESSOR  25 mg, Oral, ONCE PRN            Final diagnoses:   Palpitations   Benign essential hypertension   Premature atrial contractions       1/14/2019   [facility] EMERGENCY DEPARTMENT     John Jacome MD  01/14/19 7150

## 2019-01-15 NOTE — ED NOTES
Pt presents to ED with concerns of feeling his heart pounding all day (beating like crazy, has never had this sensation before). Pt denies having specific chest pain. Pt reports some pain and numbness radiates to the left arm. Started hydrochlorothiazide Sun.

## 2019-02-06 DIAGNOSIS — R00.2 PALPITATIONS: Primary | ICD-10-CM

## 2019-02-06 RX ORDER — METOPROLOL TARTRATE 25 MG/1
25 TABLET, FILM COATED ORAL
Qty: 60 TABLET | Refills: 0 | Status: SHIPPED | OUTPATIENT
Start: 2019-02-06

## 2019-02-14 ENCOUNTER — OFFICE VISIT (OUTPATIENT)
Dept: FAMILY MEDICINE | Facility: CLINIC | Age: 59
End: 2019-02-14
Payer: COMMERCIAL

## 2019-02-14 VITALS
WEIGHT: 195 LBS | SYSTOLIC BLOOD PRESSURE: 124 MMHG | HEIGHT: 69 IN | DIASTOLIC BLOOD PRESSURE: 86 MMHG | BODY MASS INDEX: 28.88 KG/M2 | RESPIRATION RATE: 16 BRPM | TEMPERATURE: 97.9 F | HEART RATE: 60 BPM

## 2019-02-14 DIAGNOSIS — R00.0 TACHYCARDIA: Primary | ICD-10-CM

## 2019-02-14 PROCEDURE — 99214 OFFICE O/P EST MOD 30 MIN: CPT | Performed by: FAMILY MEDICINE

## 2019-02-14 ASSESSMENT — MIFFLIN-ST. JEOR: SCORE: 1694.89

## 2019-02-14 ASSESSMENT — PAIN SCALES - GENERAL: PAINLEVEL: NO PAIN (0)

## 2019-02-14 NOTE — PROGRESS NOTES
SUBJECTIVE:                                                    Chris Maldonado is a 59 year old male who presents to clinic today for the following health issues:    ED/UC Followup:    Facility:  [facility]  Date of visit: 01/14/2019  Reason for visit: heart palpatations  Current Status: He says he is feeling better. He is now on medication for the palpitations.     Problem list and histories reviewed & adjusted, as indicated.  Additional history:   Was given metroprolol which he can take as needed.  He has been taking daily.  No side efects.    Patient Active Problem List   Diagnosis     CARDIOVASCULAR SCREENING; LDL GOAL LESS THAN 160     Gastroesophageal reflux disease with esophagitis     Past Surgical History:   Procedure Laterality Date     APPENDECTOMY  1994     ARTHROSCOPY SHOULDER RT/LT  2007    tendon repair     COLONOSCOPY N/A 1/27/2017    Procedure: COLONOSCOPY;  Surgeon: John Ramon MD;  [facility]       Social History     Tobacco Use     Smoking status: Former Smoker     Types: Cigars     Smokeless tobacco: Former User   Substance Use Topics     Alcohol use: No     Family History   Problem Relation Age of Onset     Heart Defect Mother      Obesity Mother      Dementia Father      Alzheimer Disease Father      Crohn's Disease Sister      Cerebrovascular Disease Daughter 33     Diabetes Daughter         Type 1     C.A.D. No family hx of      Hypertension No family hx of      Prostate Cancer No family hx of      Cancer - colorectal No family hx of      Breast Cancer No family hx of          Current Outpatient Medications   Medication Sig Dispense Refill     albuterol (ALBUTEROL) 108 (90 BASE) MCG/ACT inhaler Inhale 2 puffs into the lungs every 4 hours as needed for shortness of breath / dyspnea 1 Inhaler 0     metoprolol tartrate (LOPRESSOR) 25 MG tablet Take 1 tablet (25 mg) by mouth once as needed (heart palpitations.  Can take 1-2 tablets for palpitations as needed.) 60 tablet 0      "multivitamin, therapeutic with minerals (MULTI-VITAMIN) TABS tablet Take 1 tablet by mouth daily       fluticasone (FLONASE) 50 MCG/ACT spray USE 1-2 SPRAYS IN EACH NOSTRIL EVERY DAY (Patient not taking: Reported on 2/14/2019) 16 g 11     OMEPRAZOLE PO Take 1 capsule by mouth daily       Allergies   Allergen Reactions     Shellfish Allergy      Trees      Tree Pollen       ROS:  Constitutional, HEENT, cardiovascular, pulmonary, gi and gu systems are negative, except as otherwise noted.    OBJECTIVE:                                                    /86 (BP Location: Right arm, Patient Position: Sitting, Cuff Size: Adult Large)   Pulse 60   Temp 97.9  F (36.6  C) (Tympanic)   Resp 16   Ht 1.753 m (5' 9\")   Wt 88.5 kg (195 lb)   BMI 28.80 kg/m   Body mass index is 28.8 kg/m .   GENERAL: healthy, alert, well nourished, well hydrated, no distress  HENT: ear canals- normal; TMs- normal; Nose- normal; Mouth- no ulcers, no lesions  NECK: no tenderness, no adenopathy, no asymmetry, no masses, no stiffness; thyroid- normal to palpation  RESP: lungs clear to auscultation - no rales, no rhonchi, no wheezes  CV: regular rates and rhythm, normal S1 S2, no S3 or S4 and no murmur, no click or rub -  ABDOMEN: soft, no tenderness, no  hepatosplenomegaly, no masses, normal bowel sounds       ASSESSMENT/PLAN:                                                    Tachycardia- sinus tach, pst  Well controlled  He w is cuting caffine, stress out      Self relaxatio hand out given    reports that he has quit smoking. His smoking use included cigars. He has quit using smokeless tobacco.      Weight management plan: Discussed healthy diet and exercise guidelines    [facility]    "

## 2020-04-10 ENCOUNTER — HOSPITAL ENCOUNTER (OUTPATIENT)
Dept: CARDIAC REHAB | Facility: CLINIC | Age: 60
End: 2020-04-10
Attending: THORACIC SURGERY (CARDIOTHORACIC VASCULAR SURGERY)
Payer: COMMERCIAL

## 2020-04-10 PROCEDURE — 93797 PHYS/QHP OP CAR RHAB WO ECG: CPT

## 2020-04-10 PROCEDURE — 40000575 ZZH STATISTIC OP CARDIAC VISIT #2

## 2020-04-10 PROCEDURE — 40000116 ZZH STATISTIC OP CR VISIT

## 2020-04-10 PROCEDURE — 93798 PHYS/QHP OP CAR RHAB W/ECG: CPT

## 2020-04-15 ENCOUNTER — HOSPITAL ENCOUNTER (OUTPATIENT)
Dept: CARDIAC REHAB | Facility: CLINIC | Age: 60
End: 2020-04-15
Attending: THORACIC SURGERY (CARDIOTHORACIC VASCULAR SURGERY)
Payer: COMMERCIAL

## 2020-04-15 PROCEDURE — 40000116 ZZH STATISTIC OP CR VISIT: Performed by: REHABILITATION PRACTITIONER

## 2020-04-15 PROCEDURE — 93798 PHYS/QHP OP CAR RHAB W/ECG: CPT | Performed by: REHABILITATION PRACTITIONER

## 2020-04-17 ENCOUNTER — HOSPITAL ENCOUNTER (OUTPATIENT)
Dept: CARDIAC REHAB | Facility: CLINIC | Age: 60
End: 2020-04-17
Attending: THORACIC SURGERY (CARDIOTHORACIC VASCULAR SURGERY)
Payer: COMMERCIAL

## 2020-04-17 PROCEDURE — 40000116 ZZH STATISTIC OP CR VISIT

## 2020-04-17 PROCEDURE — 93798 PHYS/QHP OP CAR RHAB W/ECG: CPT

## 2020-04-20 ENCOUNTER — HOSPITAL ENCOUNTER (OUTPATIENT)
Dept: CARDIAC REHAB | Facility: CLINIC | Age: 60
End: 2020-04-20
Attending: THORACIC SURGERY (CARDIOTHORACIC VASCULAR SURGERY)
Payer: COMMERCIAL

## 2020-04-20 PROCEDURE — 40000116 ZZH STATISTIC OP CR VISIT: Performed by: REHABILITATION PRACTITIONER

## 2020-04-20 PROCEDURE — 93798 PHYS/QHP OP CAR RHAB W/ECG: CPT | Performed by: REHABILITATION PRACTITIONER

## 2020-04-22 ENCOUNTER — HOSPITAL ENCOUNTER (OUTPATIENT)
Dept: CARDIAC REHAB | Facility: CLINIC | Age: 60
End: 2020-04-22
Attending: THORACIC SURGERY (CARDIOTHORACIC VASCULAR SURGERY)
Payer: COMMERCIAL

## 2020-04-22 PROCEDURE — 40000116 ZZH STATISTIC OP CR VISIT: Performed by: REHABILITATION PRACTITIONER

## 2020-04-22 PROCEDURE — 93798 PHYS/QHP OP CAR RHAB W/ECG: CPT | Performed by: REHABILITATION PRACTITIONER

## 2020-04-27 ENCOUNTER — HOSPITAL ENCOUNTER (OUTPATIENT)
Dept: CARDIAC REHAB | Facility: CLINIC | Age: 60
End: 2020-04-27
Attending: THORACIC SURGERY (CARDIOTHORACIC VASCULAR SURGERY)
Payer: COMMERCIAL

## 2020-04-27 PROCEDURE — 40000116 ZZH STATISTIC OP CR VISIT: Performed by: REHABILITATION PRACTITIONER

## 2020-04-27 PROCEDURE — 93798 PHYS/QHP OP CAR RHAB W/ECG: CPT | Performed by: REHABILITATION PRACTITIONER

## 2020-04-29 ENCOUNTER — HOSPITAL ENCOUNTER (OUTPATIENT)
Dept: CARDIAC REHAB | Facility: CLINIC | Age: 60
End: 2020-04-29
Attending: THORACIC SURGERY (CARDIOTHORACIC VASCULAR SURGERY)
Payer: COMMERCIAL

## 2020-04-29 PROCEDURE — 40000116 ZZH STATISTIC OP CR VISIT

## 2020-04-29 PROCEDURE — 93798 PHYS/QHP OP CAR RHAB W/ECG: CPT

## 2020-05-04 ENCOUNTER — HOSPITAL ENCOUNTER (OUTPATIENT)
Dept: CARDIAC REHAB | Facility: CLINIC | Age: 60
End: 2020-05-04
Attending: THORACIC SURGERY (CARDIOTHORACIC VASCULAR SURGERY)
Payer: COMMERCIAL

## 2020-05-04 PROCEDURE — 93798 PHYS/QHP OP CAR RHAB W/ECG: CPT | Performed by: REHABILITATION PRACTITIONER

## 2020-05-04 PROCEDURE — 40000116 ZZH STATISTIC OP CR VISIT: Performed by: REHABILITATION PRACTITIONER

## 2020-05-06 ENCOUNTER — HOSPITAL ENCOUNTER (OUTPATIENT)
Dept: CARDIAC REHAB | Facility: CLINIC | Age: 60
End: 2020-05-06
Attending: THORACIC SURGERY (CARDIOTHORACIC VASCULAR SURGERY)
Payer: COMMERCIAL

## 2020-05-06 PROCEDURE — 93798 PHYS/QHP OP CAR RHAB W/ECG: CPT | Performed by: REHABILITATION PRACTITIONER

## 2020-05-06 PROCEDURE — 40000116 ZZH STATISTIC OP CR VISIT: Performed by: REHABILITATION PRACTITIONER

## 2020-05-11 ENCOUNTER — HOSPITAL ENCOUNTER (OUTPATIENT)
Dept: CARDIAC REHAB | Facility: CLINIC | Age: 60
End: 2020-05-11
Attending: THORACIC SURGERY (CARDIOTHORACIC VASCULAR SURGERY)
Payer: COMMERCIAL

## 2020-05-11 PROCEDURE — 93798 PHYS/QHP OP CAR RHAB W/ECG: CPT

## 2020-05-11 PROCEDURE — 40000116 ZZH STATISTIC OP CR VISIT

## 2020-05-13 ENCOUNTER — HOSPITAL ENCOUNTER (OUTPATIENT)
Dept: CARDIAC REHAB | Facility: CLINIC | Age: 60
End: 2020-05-13
Attending: THORACIC SURGERY (CARDIOTHORACIC VASCULAR SURGERY)
Payer: COMMERCIAL

## 2020-05-13 PROCEDURE — 40000116 ZZH STATISTIC OP CR VISIT

## 2020-05-13 PROCEDURE — 93797 PHYS/QHP OP CAR RHAB WO ECG: CPT

## 2020-05-13 PROCEDURE — 93798 PHYS/QHP OP CAR RHAB W/ECG: CPT

## 2020-05-13 PROCEDURE — 40000575 ZZH STATISTIC OP CARDIAC VISIT #2
